# Patient Record
Sex: FEMALE | Race: WHITE | NOT HISPANIC OR LATINO | URBAN - METROPOLITAN AREA
[De-identification: names, ages, dates, MRNs, and addresses within clinical notes are randomized per-mention and may not be internally consistent; named-entity substitution may affect disease eponyms.]

---

## 2020-03-04 ENCOUNTER — NEW PATIENT (OUTPATIENT)
Dept: URBAN - METROPOLITAN AREA CLINIC 51 | Facility: CLINIC | Age: 80
End: 2020-03-04
Payer: MEDICARE

## 2020-03-04 DIAGNOSIS — H04.123 TEAR FILM INSUFFICIENCY OF BILATERAL LACRIMAL GLANDS: ICD-10-CM

## 2020-03-04 DIAGNOSIS — Z96.1 PRESENCE OF INTRAOCULAR LENS: Primary | ICD-10-CM

## 2020-03-04 DIAGNOSIS — H35.3131 NONEXUDATIVE MACULAR DEGENERATION, EARLY DRY STAGE, BILATERAL: ICD-10-CM

## 2020-03-04 PROCEDURE — 92004 COMPRE OPH EXAM NEW PT 1/>: CPT | Performed by: OPTOMETRIST

## 2020-03-04 PROCEDURE — 92134 CPTRZ OPH DX IMG PST SGM RTA: CPT | Performed by: OPTOMETRIST

## 2020-03-04 ASSESSMENT — KERATOMETRY
OD: 46.81
OS: 46.95

## 2020-03-04 ASSESSMENT — VISUAL ACUITY
OS: 20/20
OD: 20/20

## 2020-03-04 ASSESSMENT — INTRAOCULAR PRESSURE
OS: 12
OD: 13

## 2020-11-04 ENCOUNTER — OFFICE VISIT (OUTPATIENT)
Dept: URBAN - METROPOLITAN AREA CLINIC 53 | Facility: CLINIC | Age: 80
End: 2020-11-04
Payer: MEDICARE

## 2020-11-04 DIAGNOSIS — H35.3132 NONEXUDATIVE AGE-RELATED MACULAR DEGENERATION, BILATERAL, INTERMEDIATE DRY STAGE: Primary | ICD-10-CM

## 2020-11-04 DIAGNOSIS — H43.813 VITREOUS DEGENERATION, BILATERAL: ICD-10-CM

## 2020-11-04 PROCEDURE — 99204 OFFICE O/P NEW MOD 45 MIN: CPT | Performed by: OPHTHALMOLOGY

## 2020-11-04 PROCEDURE — 92134 CPTRZ OPH DX IMG PST SGM RTA: CPT | Performed by: OPHTHALMOLOGY

## 2020-11-04 ASSESSMENT — INTRAOCULAR PRESSURE
OD: 10
OS: 15

## 2020-11-04 NOTE — IMPRESSION/PLAN
Impression: Nonexudative age-related macular degeneration, bilateral, intermediate dry stage: H35.3132. Plan: The patient has dry age-related macular degeneration (AMD). OCT shows no IRF/SRF OU. We discussed dry vs wet AMD, and I explained to the patient that currently there is no retinal treatment for dry AMD at this time. I recommend the patient take the AREDS formula anti-oxidant vitamins and to continue weekly self-monitoring for progression with the amsler grid. The patient understands that smoking is the most significant modifiable risk factor for the development of advanced AMD, and also understands that if they do smoke, they cannot take high doses of vitamin A/beta-carotene, since it may increase their risk for lung cancer. If there are any changes on the amsler grid, distortion or decreased vision, the patient was encouraged to contact our office immediately. Thanks Dr. Akila Gaona RTC 1 year OCT OU

## 2022-10-10 ENCOUNTER — OFFICE VISIT (OUTPATIENT)
Dept: URBAN - METROPOLITAN AREA CLINIC 54 | Facility: CLINIC | Age: 82
End: 2022-10-10
Payer: COMMERCIAL

## 2022-10-10 DIAGNOSIS — H43.813 VITREOUS DEGENERATION, BILATERAL: ICD-10-CM

## 2022-10-10 DIAGNOSIS — H35.3132 NONEXUDATIVE AGE-RELATED MACULAR DEGENERATION, BILATERAL, INTERMEDIATE DRY STAGE: Primary | ICD-10-CM

## 2022-10-10 PROCEDURE — 92134 CPTRZ OPH DX IMG PST SGM RTA: CPT | Performed by: OPHTHALMOLOGY

## 2022-10-10 PROCEDURE — 99213 OFFICE O/P EST LOW 20 MIN: CPT | Performed by: OPHTHALMOLOGY

## 2022-10-10 ASSESSMENT — INTRAOCULAR PRESSURE
OD: 9
OS: 8

## 2022-10-10 NOTE — IMPRESSION/PLAN
Impression: Nonexudative age-related macular degeneration, bilateral, intermediate dry stage: H35.3132. Plan: She returns for eval and is doing well. The patient has dry age-related macular degeneration (AMD). OCT shows PEDs but no IRF/SRF OU. We discussed dry vs wet AMD, and I explained to the patient that currently there is no retinal treatment for dry AMD at this time. I recommend the patient take the AREDS formula anti-oxidant vitamins and to continue weekly self-monitoring for progression with the amsler grid. If there are any changes on the amsler grid, distortion or decreased vision, the patient was encouraged to contact our office immediately. Otherwise, she will f/u with your excellent care. I am happy to see her again anytime.  
thanks Forrest
RTC PRN